# Patient Record
Sex: FEMALE | Race: WHITE
[De-identification: names, ages, dates, MRNs, and addresses within clinical notes are randomized per-mention and may not be internally consistent; named-entity substitution may affect disease eponyms.]

---

## 2023-08-28 ENCOUNTER — HOSPITAL ENCOUNTER (EMERGENCY)
Dept: HOSPITAL 75 - ER | Age: 23
Discharge: HOME | End: 2023-08-28
Payer: COMMERCIAL

## 2023-08-28 VITALS — WEIGHT: 130.07 LBS | BODY MASS INDEX: 21.67 KG/M2 | HEIGHT: 64.96 IN

## 2023-08-28 VITALS — SYSTOLIC BLOOD PRESSURE: 118 MMHG | DIASTOLIC BLOOD PRESSURE: 75 MMHG

## 2023-08-28 DIAGNOSIS — N12: Primary | ICD-10-CM

## 2023-08-28 DIAGNOSIS — F17.290: ICD-10-CM

## 2023-08-28 LAB
ALBUMIN SERPL-MCNC: 4.3 GM/DL (ref 3.2–4.5)
ALP SERPL-CCNC: 65 U/L (ref 40–136)
ALT SERPL-CCNC: 7 U/L (ref 0–55)
APTT PPP: YELLOW S
BACTERIA #/AREA URNS HPF: (no result) /HPF
BARBITURATES UR QL: NEGATIVE
BASOPHILS # BLD AUTO: 0 10^3/UL (ref 0–0.1)
BASOPHILS NFR BLD AUTO: 0 % (ref 0–10)
BENZODIAZ UR QL SCN: NEGATIVE
BILIRUB SERPL-MCNC: 0.4 MG/DL (ref 0.1–1)
BILIRUB UR QL STRIP: (no result)
BUN/CREAT SERPL: 9
CALCIUM SERPL-MCNC: 9.1 MG/DL (ref 8.5–10.1)
CHLORIDE SERPL-SCNC: 109 MMOL/L (ref 98–107)
CO2 SERPL-SCNC: 24 MMOL/L (ref 21–32)
COCAINE UR QL: NEGATIVE
CREAT SERPL-MCNC: 0.76 MG/DL (ref 0.6–1.3)
EOSINOPHIL # BLD AUTO: 0.1 10^3/UL (ref 0–0.3)
EOSINOPHIL NFR BLD AUTO: 1 % (ref 0–10)
FIBRINOGEN PPP-MCNC: (no result) MG/DL
GFR SERPLBLD BASED ON 1.73 SQ M-ARVRAT: 115 ML/MIN
GLUCOSE SERPL-MCNC: 91 MG/DL (ref 70–105)
GLUCOSE UR STRIP-MCNC: NEGATIVE MG/DL
HCT VFR BLD CALC: 37 % (ref 35–52)
HGB BLD-MCNC: 11.7 G/DL (ref 11.5–16)
KETONES UR QL STRIP: NEGATIVE
LEUKOCYTE ESTERASE UR QL STRIP: (no result)
LIPASE SERPL-CCNC: 22 U/L (ref 8–78)
LYMPHOCYTES # BLD AUTO: 1.5 10^3/UL (ref 1–4)
LYMPHOCYTES NFR BLD AUTO: 24 % (ref 12–44)
MANUAL DIFFERENTIAL PERFORMED BLD QL: NO
MCH RBC QN AUTO: 28 PG (ref 25–34)
MCHC RBC AUTO-ENTMCNC: 32 G/DL (ref 32–36)
MCV RBC AUTO: 87 FL (ref 80–99)
METHADONE UR QL SCN: NEGATIVE
MONOCYTES # BLD AUTO: 0.5 10^3/UL (ref 0–1)
MONOCYTES NFR BLD AUTO: 8 % (ref 0–12)
NEUTROPHILS # BLD AUTO: 4.1 10^3/UL (ref 1.8–7.8)
NEUTROPHILS NFR BLD AUTO: 66 % (ref 42–75)
NITRITE UR QL STRIP: NEGATIVE
OPIATES UR QL SCN: NEGATIVE
OXYCODONE UR QL: NEGATIVE
PH UR STRIP: 5.5 [PH] (ref 5–9)
PLATELET # BLD: 175 10^3/UL (ref 130–400)
PMV BLD AUTO: 11.4 FL (ref 9–12.2)
POTASSIUM SERPL-SCNC: 3.4 MMOL/L (ref 3.6–5)
PROPOXYPH UR QL: NEGATIVE
PROT SERPL-MCNC: 6.9 GM/DL (ref 6.4–8.2)
PROT UR QL STRIP: (no result)
RBC #/AREA URNS HPF: (no result) /HPF
SODIUM SERPL-SCNC: 139 MMOL/L (ref 135–145)
SP GR UR STRIP: >=1.03 (ref 1.02–1.02)
TRICYCLICS UR QL SCN: NEGATIVE
WBC # BLD AUTO: 6.2 10^3/UL (ref 4.3–11)
WBC #/AREA URNS HPF: (no result) /HPF

## 2023-08-28 PROCEDURE — 81000 URINALYSIS NONAUTO W/SCOPE: CPT

## 2023-08-28 PROCEDURE — 87077 CULTURE AEROBIC IDENTIFY: CPT

## 2023-08-28 PROCEDURE — 83690 ASSAY OF LIPASE: CPT

## 2023-08-28 PROCEDURE — 80053 COMPREHEN METABOLIC PANEL: CPT

## 2023-08-28 PROCEDURE — 80320 DRUG SCREEN QUANTALCOHOLS: CPT

## 2023-08-28 PROCEDURE — 74176 CT ABD & PELVIS W/O CONTRAST: CPT

## 2023-08-28 PROCEDURE — 87186 SC STD MICRODIL/AGAR DIL: CPT

## 2023-08-28 PROCEDURE — 85025 COMPLETE CBC W/AUTO DIFF WBC: CPT

## 2023-08-28 PROCEDURE — 36415 COLL VENOUS BLD VENIPUNCTURE: CPT

## 2023-08-28 PROCEDURE — 80306 DRUG TEST PRSMV INSTRMNT: CPT

## 2023-08-28 PROCEDURE — 84703 CHORIONIC GONADOTROPIN ASSAY: CPT

## 2023-08-28 PROCEDURE — 87088 URINE BACTERIA CULTURE: CPT

## 2023-08-28 NOTE — ED ABDOMINAL PAIN
General


Chief Complaint:  Back Problems


Stated Complaint:  RT SIDE PAIN


Nursing Triage Note:  


PT AMB TO [facility] 2 WITH C/O R FLANK PAIN X3 DAYS AND BLOOD WHEN SHE WIPES AFTER 


URINATING


Source of Information:  Patient


Exam Limitations:  No Limitations





History of Present Illness


Date Seen by Provider:  Aug 28, 2023


Time Seen by Provider:  13:10


Initial Comments


22-year-old female presents the ER with complaint of intermittent right side 

pain that has been migrating towards her abdomen.  States the pain started 3 

days ago.  Reports nausea, denies vomiting.  Reports dysuria and hematuria.  

States her last bowel movement was 2 days ago, states this normal for her.  Last

menstrual cycle was 7/28.  Denies any fevers.  She has not had any abdominal 

surgeries.





Allergies and Home Medications


Allergies


Coded Allergies:  


     No Known Drug Allergies (Unverified , 8/28/23)





Patient Home Medication List


Home Medication List Reviewed:  Yes


Sulfamethoxazole/Trimethoprim (Bactrim Ds Tablet) 1 Each Tablet, 1 EACH PO BID


   Prescribed by: Karie Linares on 8/28/23 1440





Review of Systems


Review of Systems


Constitutional:  see HPI





Past Medical-Social-Family Hx


Patient Social History


Tobacco Use?:  No


Use of E-Cig and/or Vaping dev:  Yes


E-Cig or Vaping type used:  Nicotine


Substance use?:  Yes


Substance type:  Marijuana


Substance frequency:  Once in a while


Alcohol Use?:  Yes


Alcohol Frequency:  Once in a while


Pt feels they are or have been:  No





Past Medical History


Surgery/Hospitalization HX:  


DENIES


Last Menstrual Period:  Jul 28, 2023





Physical Exam


Vital Signs





Vital Signs - First Documented








 8/28/23





 13:15


 


Temp 37.1


 


Pulse 89


 


Resp 14


 


B/P (MAP) 118/75 (89)


 


Pulse Ox 100


 


O2 Delivery Room Air





Capillary Refill :


Height/Weight/BMI


Height: '"


Weight: lbs. oz. kg; 21.00 BMI


Method:


General Appearance:  WD/WN, no apparent distress


Neck:  supple, normal inspection


Respiratory:  lungs clear, normal breath sounds, no respiratory distress, no 

accessory muscle use


Cardiovascular:  regular rate, rhythm


Gastrointestinal:  normal bowel sounds, soft; No guarding; tenderness (Left 

upper and left lower quadrant)


Extremities:  normal range of motion, normal inspection


Back:  CVA tenderness (L)


Neurologic/Psychiatric:  alert, normal mood/affect


Skin:  normal color, warm/dry





Progress/Results/Core Measures


Results/Orders


Lab Results





Laboratory Tests








Test


 8/28/23


13:26 8/28/23


13:48 Range/Units


 


 


Urine Color YELLOW    


 


Urine Clarity CLOUDY    


 


Urine pH 5.5   5-9  


 


Urine Specific Gravity >=1.030   1.016-1.022  


 


Urine Protein 2+ H  NEGATIVE  


 


Urine Glucose (UA) NEGATIVE   NEGATIVE  


 


Urine Ketones NEGATIVE   NEGATIVE  


 


Urine Nitrite NEGATIVE   NEGATIVE  


 


Urine Bilirubin 1+ H  NEGATIVE  


 


Urine Urobilinogen 0.2   < = 1.0  MG/DL


 


Urine Leukocyte Esterase 3+ H  NEGATIVE  


 


Urine RBC (Auto) 3+ H  NEGATIVE  


 


Urine RBC 25-50 H   /HPF


 


Urine WBC  H   /HPF


 


Urine Squamous Epithelial


Cells 10-25 H


 


  /HPF





 


Urine Crystals NONE    /LPF


 


Urine Bacteria LARGE H   /HPF


 


Urine Casts NONE    /LPF


 


Urine Mucus NEGATIVE    /LPF


 


Urine Culture Indicated YES    


 


Urine Opiates Screen NEGATIVE   NEGATIVE  


 


Urine Oxycodone Screen NEGATIVE   NEGATIVE  


 


Urine Methadone Screen NEGATIVE   NEGATIVE  


 


Urine Propoxyphene Screen NEGATIVE   NEGATIVE  


 


Urine Barbiturates Screen NEGATIVE   NEGATIVE  


 


Ur Tricyclic Antidepressants


Screen NEGATIVE 


 


 NEGATIVE  





 


Urine Phencyclidine Screen NEGATIVE   NEGATIVE  


 


Urine Amphetamines Screen NEGATIVE   NEGATIVE  


 


Urine Methamphetamines Screen NEGATIVE   NEGATIVE  


 


Urine Benzodiazepines Screen NEGATIVE   NEGATIVE  


 


Urine Cocaine Screen NEGATIVE   NEGATIVE  


 


Urine Cannabinoids Screen NEGATIVE   NEGATIVE  


 


White Blood Count


 


 6.2 


 4.3-11.0


10^3/uL


 


Red Blood Count


 


 4.23 


 3.80-5.11


10^6/uL


 


Hemoglobin  11.7  11.5-16.0  g/dL


 


Hematocrit  37  35-52  %


 


Mean Corpuscular Volume  87  80-99  fL


 


Mean Corpuscular Hemoglobin  28  25-34  pg


 


Mean Corpuscular Hemoglobin


Concent 


 32 


 32-36  g/dL





 


Red Cell Distribution Width  14.1  10.0-14.5  %


 


Platelet Count


 


 175 


 130-400


10^3/uL


 


Mean Platelet Volume  11.4  9.0-12.2  fL


 


Immature Granulocyte % (Auto)  0   %


 


Neutrophils (%) (Auto)  66  42-75  %


 


Lymphocytes (%) (Auto)  24  12-44  %


 


Monocytes (%) (Auto)  8  0-12  %


 


Eosinophils (%) (Auto)  1  0-10  %


 


Basophils (%) (Auto)  0  0-10  %


 


Neutrophils # (Auto)


 


 4.1 


 1.8-7.8


10^3/uL


 


Lymphocytes # (Auto)


 


 1.5 


 1.0-4.0


10^3/uL


 


Monocytes # (Auto)


 


 0.5 


 0.0-1.0


10^3/uL


 


Eosinophils # (Auto)


 


 0.1 


 0.0-0.3


10^3/uL


 


Basophils # (Auto)


 


 0.0 


 0.0-0.1


10^3/uL


 


Immature Granulocyte # (Auto)


 


 0.0 


 0.0-0.1


10^3/uL


 


Sodium Level  139  135-145  MMOL/L


 


Potassium Level  3.4 L 3.6-5.0  MMOL/L


 


Chloride Level  109 H   MMOL/L


 


Carbon Dioxide Level  24  21-32  MMOL/L


 


Anion Gap  6  5-14  MMOL/L


 


Blood Urea Nitrogen  7  7-18  MG/DL


 


Creatinine


 


 0.76 


 0.60-1.30


MG/DL


 


Estimat Glomerular Filtration


Rate 


 115 


  





 


BUN/Creatinine Ratio  9   


 


Glucose Level  91    MG/DL


 


Calcium Level  9.1  8.5-10.1  MG/DL


 


Corrected Calcium  8.9  8.5-10.1  MG/DL


 


Total Bilirubin  0.4  0.1-1.0  MG/DL


 


Aspartate Amino Transf


(AST/SGOT) 


 15 


 5-34  U/L





 


Alanine Aminotransferase


(ALT/SGPT) 


 7 


 0-55  U/L





 


Alkaline Phosphatase  65    U/L


 


Total Protein  6.9  6.4-8.2  GM/DL


 


Albumin  4.3  3.2-4.5  GM/DL


 


Lipase  22  8-78  U/L


 


Serum Alcohol  < 10  <10  MG/DL








My Orders





Orders - KARIE KAUFFMAN


Ua Culture If Indicated (8/28/23 13:10)


Urine Pregnancy Bedside (8/28/23 13:10)


Drug Screen Stat (Urine) (8/28/23 13:24)


Alcohol (8/28/23 13:24)


Comprehensive Metabolic Panel (8/28/23 13:24)


Lipase (8/28/23 13:24)


Ed Iv/Invasive Line Start (8/28/23 13:24)


Cbc With Automated Diff (8/28/23 13:24)


Ns Iv 1000 Ml (Ns Iv 1000 Ml) (8/28/23 13:30)


Urine Culture (8/28/23 13:26)


Ct Abd/Pelvis Wo(Kidney Stone) (8/28/23 13:58)


Ceftriaxone  Iv/Im (Ceftriaxone  Iv/Im) (8/28/23 14:15)





Medications Given in ED





Current Medications








 Medications  Dose


 Ordered  Sig/Belén


 Route  Start Time


 Stop Time Status Last Admin


Dose Admin


 


 Ceftriaxone


 Sodium 1000 mg/


 Sodium Chloride  50 ml @ 


 100 mls/hr  ONCE  ONCE


 IV  8/28/23 14:15


 8/28/23 14:44 DC 8/28/23 14:27


100 MLS/HR








Vital Signs/I&O











 8/28/23





 13:15


 


Temp 37.1


 


Pulse 89


 


Resp 14


 


B/P (MAP) 118/75 (89)


 


Pulse Ox 100


 


O2 Delivery Room Air














Blood Pressure Mean:                    89











Progress


Progress Note :  


Progress Note


Patient seen and evaluated, resting comfortably in bed, no acute distress. 

Reporting right-sided pain, but on examination she is tender in left upper and 

left lower quadrant.  Patient then stated that she was also having left flank 

pain.  Based on exam and symptoms, differential diagnosis includes but is not 

limited to nephrolithiasis, pyelonephritis, UTI, other intra-abdominal 

pathology.  Work-up initiated including CBC, CMP, lipase, UA, urine pregnancy.  

IV fluids ordered.  Patient seems like she may be intoxicated, she laughs 

inappropriately at times, slow in answering questions.  She denies any drug or 

alcohol use.  Alcohol level and drug screen ordered.





1429 Labs and CT reviewed. CBC grossly normal.  CMP shows slightly decreased 

potassium 3.4, slightly elevated chloride 109.  Urinalysis shows 2+ protein, 1+ 

bilirubin, 3+ leukocytes, 3+ RBCs,  WBCs, 10-25 squamous epithelial cells,

large bacteria.  Urine drug screen and alcohol level negative.  CT abdomen 

pelvis shows diffuse wall thickening of the urinary bladder.  Also shows trace 

free fluid within the pelvis which may be reactive, no loculated fluid 

collections.  No stones in the ureters or bladder.  Rocephin ordered for urinary

tract infection.  This is likely pyelonephritis.  Results discussed with 

patient.  Will discharge after antibiotic.  Discharge instructions and return 

precautions provided.





Departure


Impression





   Primary Impression:  


   Pyelonephritis


Disposition:  01 HOME, SELF-CARE


Condition:  Stable





Departure-Patient Inst.


Decision time for Depature:  14:37


Referrals:  


CASI MEHEAN MD (PCP/Family)


Primary Care Physician


Patient Instructions:  Kidney Infection (DC)





Add. Discharge Instructions:  


Complete full course of the antibiotic as prescribed, do not stop taking even if

you begin to feel better.


Return for any new, concerning, or worsening symptoms.





All discharge instructions reviewed with patient and/or family. Voiced 

understanding.


Scripts


Sulfamethoxazole/Trimethoprim (Bactrim Ds Tablet) 1 Each Tablet


1 EACH PO BID for 14 Days, #28 TAB


   Prov: KARIE KAUFFMAN         8/28/23











KARIE KAUFFMAN          Aug 28, 2023 13:30

## 2023-08-28 NOTE — DIAGNOSTIC IMAGING REPORT
PROCEDURE: CT urinary tract, rule out kidney stone.



TECHNIQUE: Multiple contiguous axial images were obtained through

the abdomen and pelvis without the use of intravenous contrast.

Auto Exposure Controls were utilized during the CT exam to meet

ALARA standards for radiation dose reduction. 



INDICATION:  Right-sided abdominal pain and hematuria.



COMPARISON: None.



FINDINGS:

Included views of the chest demonstrates no significant

abnormality.



Liver, spleen, and adrenal glands are normal. The pancreas is

normal. The bowel is nondilated. The appendix is normal. 



Aorta and IVC are normal.



There is diffuse wall thickening of the urinary bladder. The

kidneys are normal. No nephrolithiasis or hydronephrosis. No

stones within the ureters or urinary bladder. Trace free fluid

within the pelvis. No loculated fluid collections.



The osseous structures demonstrate no lytic or sclerotic bone

lesion.



IMPRESSION:

Diffuse wall thickening in the urinary bladder may be seen with

urinary tract infection.



Trace free fluid within the pelvis May BE reactive. No loculated

fluid collections.



No stones within the ureters or urinary bladder. No

hydronephrosis.













Dictated by: 



  Dictated on workstation # [id number]